# Patient Record
Sex: MALE | Race: OTHER | NOT HISPANIC OR LATINO | URBAN - METROPOLITAN AREA
[De-identification: names, ages, dates, MRNs, and addresses within clinical notes are randomized per-mention and may not be internally consistent; named-entity substitution may affect disease eponyms.]

---

## 2018-03-29 ENCOUNTER — EMERGENCY (EMERGENCY)
Facility: HOSPITAL | Age: 43
LOS: 1 days | Discharge: ANOTHER HOSPITAL | End: 2018-03-29
Attending: EMERGENCY MEDICINE
Payer: MEDICAID

## 2018-03-29 ENCOUNTER — INPATIENT (INPATIENT)
Facility: HOSPITAL | Age: 43
LOS: 1 days | Discharge: HOME | DRG: 84 | End: 2018-03-31
Attending: SURGERY | Admitting: SURGERY
Payer: MEDICAID

## 2018-03-29 VITALS
RESPIRATION RATE: 17 BRPM | TEMPERATURE: 98 F | DIASTOLIC BLOOD PRESSURE: 110 MMHG | HEART RATE: 104 BPM | SYSTOLIC BLOOD PRESSURE: 144 MMHG | OXYGEN SATURATION: 100 %

## 2018-03-29 VITALS
DIASTOLIC BLOOD PRESSURE: 88 MMHG | TEMPERATURE: 98 F | RESPIRATION RATE: 18 BRPM | OXYGEN SATURATION: 99 % | HEIGHT: 68 IN | SYSTOLIC BLOOD PRESSURE: 135 MMHG | HEART RATE: 104 BPM | WEIGHT: 160.06 LBS

## 2018-03-29 LAB
ALBUMIN SERPL ELPH-MCNC: 4.9 G/DL — SIGNIFICANT CHANGE UP (ref 3.3–5)
ALP SERPL-CCNC: 60 U/L — SIGNIFICANT CHANGE UP (ref 40–120)
ALT FLD-CCNC: 15 U/L RC — SIGNIFICANT CHANGE UP (ref 10–45)
ANION GAP SERPL CALC-SCNC: 17 MMOL/L — SIGNIFICANT CHANGE UP (ref 5–17)
ANION GAP SERPL CALC-SCNC: 9 MMOL/L — SIGNIFICANT CHANGE UP (ref 5–17)
ANISOCYTOSIS BLD QL: SIGNIFICANT CHANGE UP
APTT BLD: 30.9 SEC — SIGNIFICANT CHANGE UP (ref 27.5–37.4)
APTT BLD: 33.1 SEC — SIGNIFICANT CHANGE UP (ref 27.5–37.4)
AST SERPL-CCNC: 40 U/L — SIGNIFICANT CHANGE UP (ref 10–40)
BASOPHILS # BLD AUTO: 0 K/UL — SIGNIFICANT CHANGE UP (ref 0–0.2)
BASOPHILS # BLD AUTO: 0.1 K/UL — SIGNIFICANT CHANGE UP (ref 0–0.2)
BASOPHILS NFR BLD AUTO: 0.4 % — SIGNIFICANT CHANGE UP (ref 0–2)
BASOPHILS NFR BLD AUTO: 1.1 % — SIGNIFICANT CHANGE UP (ref 0–2)
BILIRUB SERPL-MCNC: 0.5 MG/DL — SIGNIFICANT CHANGE UP (ref 0.2–1.2)
BLD GP AB SCN SERPL QL: NEGATIVE — SIGNIFICANT CHANGE UP
BUN SERPL-MCNC: 11 MG/DL — SIGNIFICANT CHANGE UP (ref 7–23)
BUN SERPL-MCNC: 12 MG/DL — SIGNIFICANT CHANGE UP (ref 7–18)
CALCIUM SERPL-MCNC: 8.3 MG/DL — LOW (ref 8.4–10.5)
CALCIUM SERPL-MCNC: 8.9 MG/DL — SIGNIFICANT CHANGE UP (ref 8.4–10.5)
CHLORIDE SERPL-SCNC: 103 MMOL/L — SIGNIFICANT CHANGE UP (ref 96–108)
CHLORIDE SERPL-SCNC: 109 MMOL/L — HIGH (ref 96–108)
CO2 SERPL-SCNC: 22 MMOL/L — SIGNIFICANT CHANGE UP (ref 22–31)
CO2 SERPL-SCNC: 24 MMOL/L — SIGNIFICANT CHANGE UP (ref 22–31)
CREAT SERPL-MCNC: 0.73 MG/DL — SIGNIFICANT CHANGE UP (ref 0.5–1.3)
CREAT SERPL-MCNC: 0.79 MG/DL — SIGNIFICANT CHANGE UP (ref 0.5–1.3)
EOSINOPHIL # BLD AUTO: 0 K/UL — SIGNIFICANT CHANGE UP (ref 0–0.5)
EOSINOPHIL # BLD AUTO: 0 K/UL — SIGNIFICANT CHANGE UP (ref 0–0.5)
EOSINOPHIL NFR BLD AUTO: 0.7 % — SIGNIFICANT CHANGE UP (ref 0–6)
EOSINOPHIL NFR BLD AUTO: 0.9 % — SIGNIFICANT CHANGE UP (ref 0–6)
ETHANOL SERPL-MCNC: 313 MG/DL — HIGH (ref 0–10)
ETHANOL SERPL-MCNC: 381 MG/DL — HIGH (ref 0–10)
GLUCOSE SERPL-MCNC: 101 MG/DL — HIGH (ref 70–99)
GLUCOSE SERPL-MCNC: 101 MG/DL — HIGH (ref 70–99)
HCT VFR BLD CALC: 36.2 % — LOW (ref 39–50)
HCT VFR BLD CALC: 36.4 % — LOW (ref 39–50)
HGB BLD-MCNC: 10 G/DL — LOW (ref 13–17)
HGB BLD-MCNC: 11.5 G/DL — LOW (ref 13–17)
HYPOCHROMIA BLD QL: SIGNIFICANT CHANGE UP
INR BLD: 0.95 RATIO — SIGNIFICANT CHANGE UP (ref 0.88–1.16)
INR BLD: 0.96 RATIO — SIGNIFICANT CHANGE UP (ref 0.88–1.16)
LYMPHOCYTES # BLD AUTO: 0.9 K/UL — LOW (ref 1–3.3)
LYMPHOCYTES # BLD AUTO: 1.2 K/UL — SIGNIFICANT CHANGE UP (ref 1–3.3)
LYMPHOCYTES # BLD AUTO: 17.9 % — SIGNIFICANT CHANGE UP (ref 13–44)
LYMPHOCYTES # BLD AUTO: 24.7 % — SIGNIFICANT CHANGE UP (ref 13–44)
MACROCYTES BLD QL: SLIGHT — SIGNIFICANT CHANGE UP
MCHC RBC-ENTMCNC: 19.1 PG — LOW (ref 27–34)
MCHC RBC-ENTMCNC: 21.1 PG — LOW (ref 27–34)
MCHC RBC-ENTMCNC: 27.7 GM/DL — LOW (ref 32–36)
MCHC RBC-ENTMCNC: 31.6 GM/DL — LOW (ref 32–36)
MCV RBC AUTO: 66.8 FL — LOW (ref 80–100)
MCV RBC AUTO: 68.9 FL — LOW (ref 80–100)
MICROCYTES BLD QL: SIGNIFICANT CHANGE UP
MONOCYTES # BLD AUTO: 0.2 K/UL — SIGNIFICANT CHANGE UP (ref 0–0.9)
MONOCYTES # BLD AUTO: 0.3 K/UL — SIGNIFICANT CHANGE UP (ref 0–0.9)
MONOCYTES NFR BLD AUTO: 4.8 % — SIGNIFICANT CHANGE UP (ref 2–14)
MONOCYTES NFR BLD AUTO: 5.6 % — SIGNIFICANT CHANGE UP (ref 2–14)
NEUTROPHILS # BLD AUTO: 3.3 K/UL — SIGNIFICANT CHANGE UP (ref 1.8–7.4)
NEUTROPHILS # BLD AUTO: 3.6 K/UL — SIGNIFICANT CHANGE UP (ref 1.8–7.4)
NEUTROPHILS NFR BLD AUTO: 68.5 % — SIGNIFICANT CHANGE UP (ref 43–77)
NEUTROPHILS NFR BLD AUTO: 75.5 % — SIGNIFICANT CHANGE UP (ref 43–77)
PLAT MORPH BLD: NORMAL — SIGNIFICANT CHANGE UP
PLATELET # BLD AUTO: 172 K/UL — SIGNIFICANT CHANGE UP (ref 150–400)
PLATELET # BLD AUTO: 192 K/UL — SIGNIFICANT CHANGE UP (ref 150–400)
POIKILOCYTOSIS BLD QL AUTO: SLIGHT — SIGNIFICANT CHANGE UP
POLYCHROMASIA BLD QL SMEAR: SLIGHT — SIGNIFICANT CHANGE UP
POTASSIUM SERPL-MCNC: 4 MMOL/L — SIGNIFICANT CHANGE UP (ref 3.5–5.3)
POTASSIUM SERPL-MCNC: 4.7 MMOL/L — SIGNIFICANT CHANGE UP (ref 3.5–5.3)
POTASSIUM SERPL-SCNC: 4 MMOL/L — SIGNIFICANT CHANGE UP (ref 3.5–5.3)
POTASSIUM SERPL-SCNC: 4.7 MMOL/L — SIGNIFICANT CHANGE UP (ref 3.5–5.3)
PROT SERPL-MCNC: 8.6 G/DL — HIGH (ref 6–8.3)
PROTHROM AB SERPL-ACNC: 10.3 SEC — SIGNIFICANT CHANGE UP (ref 9.8–12.7)
PROTHROM AB SERPL-ACNC: 10.5 SEC — SIGNIFICANT CHANGE UP (ref 9.8–12.7)
RBC # BLD: 5.25 M/UL — SIGNIFICANT CHANGE UP (ref 4.2–5.8)
RBC # BLD: 5.46 M/UL — SIGNIFICANT CHANGE UP (ref 4.2–5.8)
RBC # FLD: 17.9 % — HIGH (ref 10.3–14.5)
RBC # FLD: 18.1 % — HIGH (ref 10.3–14.5)
RBC BLD AUTO: ABNORMAL
RH IG SCN BLD-IMP: NEGATIVE — SIGNIFICANT CHANGE UP
SODIUM SERPL-SCNC: 142 MMOL/L — SIGNIFICANT CHANGE UP (ref 135–145)
SODIUM SERPL-SCNC: 142 MMOL/L — SIGNIFICANT CHANGE UP (ref 135–145)
TARGETS BLD QL SMEAR: SLIGHT — SIGNIFICANT CHANGE UP
WBC # BLD: 4.8 K/UL — SIGNIFICANT CHANGE UP (ref 3.8–10.5)
WBC # BLD: 4.8 K/UL — SIGNIFICANT CHANGE UP (ref 3.8–10.5)
WBC # FLD AUTO: 4.8 K/UL — SIGNIFICANT CHANGE UP (ref 3.8–10.5)
WBC # FLD AUTO: 4.8 K/UL — SIGNIFICANT CHANGE UP (ref 3.8–10.5)

## 2018-03-29 PROCEDURE — 72125 CT NECK SPINE W/O DYE: CPT

## 2018-03-29 PROCEDURE — 72125 CT NECK SPINE W/O DYE: CPT | Mod: 26

## 2018-03-29 PROCEDURE — 85730 THROMBOPLASTIN TIME PARTIAL: CPT

## 2018-03-29 PROCEDURE — 99291 CRITICAL CARE FIRST HOUR: CPT

## 2018-03-29 PROCEDURE — 99285 EMERGENCY DEPT VISIT HI MDM: CPT | Mod: 25

## 2018-03-29 PROCEDURE — 80307 DRUG TEST PRSMV CHEM ANLYZR: CPT

## 2018-03-29 PROCEDURE — 80048 BASIC METABOLIC PNL TOTAL CA: CPT

## 2018-03-29 PROCEDURE — 71045 X-RAY EXAM CHEST 1 VIEW: CPT

## 2018-03-29 PROCEDURE — 70450 CT HEAD/BRAIN W/O DYE: CPT | Mod: 26

## 2018-03-29 PROCEDURE — 85610 PROTHROMBIN TIME: CPT

## 2018-03-29 PROCEDURE — 99291 CRITICAL CARE FIRST HOUR: CPT | Mod: 25

## 2018-03-29 PROCEDURE — 93010 ELECTROCARDIOGRAM REPORT: CPT

## 2018-03-29 PROCEDURE — 71045 X-RAY EXAM CHEST 1 VIEW: CPT | Mod: 26

## 2018-03-29 PROCEDURE — 82962 GLUCOSE BLOOD TEST: CPT

## 2018-03-29 PROCEDURE — 70450 CT HEAD/BRAIN W/O DYE: CPT

## 2018-03-29 PROCEDURE — 85027 COMPLETE CBC AUTOMATED: CPT

## 2018-03-29 RX ORDER — DIPHENHYDRAMINE HCL 50 MG
25 CAPSULE ORAL ONCE
Qty: 0 | Refills: 0 | Status: COMPLETED | OUTPATIENT
Start: 2018-03-29 | End: 2018-03-29

## 2018-03-29 RX ORDER — SODIUM CHLORIDE 9 MG/ML
1000 INJECTION, SOLUTION INTRAVENOUS
Qty: 0 | Refills: 0 | Status: DISCONTINUED | OUTPATIENT
Start: 2018-03-29 | End: 2018-03-31

## 2018-03-29 RX ORDER — HYDROCORTISONE 20 MG
100 TABLET ORAL ONCE
Qty: 0 | Refills: 0 | Status: COMPLETED | OUTPATIENT
Start: 2018-03-29 | End: 2018-03-29

## 2018-03-29 RX ADMIN — Medication 100 MILLIGRAM(S): at 20:23

## 2018-03-29 RX ADMIN — SODIUM CHLORIDE 250 MILLILITER(S): 9 INJECTION, SOLUTION INTRAVENOUS at 19:25

## 2018-03-29 NOTE — ED PROVIDER NOTE - ENMT, MLM
Airway patent, Nasal mucosa clear. Mouth with normal mucosa. Throat has no vesicles, no oropharyngeal exudates and uvula is midline. Poor dentition.

## 2018-03-29 NOTE — CONSULT NOTE ADULT - SUBJECTIVE AND OBJECTIVE BOX
Pager: [phone]     HPI: 43M with unknown past medical history found intoxicated the baseline of apartment. Initially, unresponsive and brought to hospital where CT head showed small left frontal contusion and traumatic subarachnoid. He was transferred to [facility] for neurosurgical evaluation. Repeat CT head showed worsening left frontal contusions. Patient denies headache but unable to give detailed history.     PAST MEDICAL HISTORY   Unknown    PAST SURGICAL HISTORY   Unknown    No Known Allergies    MEDICATIONS:  Antibiotics:    Neuro:    Anticoagulation:    Other:  multivitamin/thiamine/folic acid in sodium chloride 0.9% 1000 milliLiter(s) IV Continuous <Continuous>    REVIEW OF SYSTEMS:  Check here if all are normal other than Neurological []  General:  Eyes:  ENT:  Cardiac:  Respiratory:  GI: abd pain  Musculoskeletal:   Skin:  Neurologic:   Psychiatric:     PHYSICAL EXAMINATION:   T(C): 36.9 (03-29-18 @ 18:30), Max: 36.9 (03-29-18 @ 18:30)  HR: 109 (03-29-18 @ 18:30) (104 - 109)  BP: 153/107 (03-29-18 @ 18:30) (144/110 - 153/107)  RR: 18 (03-29-18 @ 18:30) (17 - 18)  SpO2: 100% (03-29-18 @ 18:30) (100% - 100%)  Wt(kg): --    AOx1, Following Commands  CN: PERRL, EOMI, no facial droop  Motor: 5/5 throughout, no drift  Sensation intact to light touch  Reflexes: no clonus     LABS:    PT/INR - ( 29 Mar 2018 18:40 )   PT: 10.5 sec;   INR: 0.96 ratio         PTT - ( 29 Mar 2018 18:40 )  PTT:30.9 sec

## 2018-03-29 NOTE — ED ADULT NURSE NOTE - OBJECTIVE STATEMENT
42 yo M presents to ED A+Ox2 via EMS, transfer from [place] ED 44 yo M presents to ED A+Ox2 via EMS, transfer from [place] ED for head bleed. As per EMS, pt. was "found down" 44 yo M presents to ED A+Ox2 via EMS, transfer from [place] ED for head bleed. As per EMS, pt. was "found down" and "altered." Pt. denies pain, when asked questions repeating "it is okay." Pt. alert 42 yo M presents to ED A+Ox2 via EMS, transfer from [place] ED for head bleed. As per EMS, pt. was "found down" and "altered." Pt. denies pain, when asked questions repeating "it is okay." Pt. alert to person and place. Pupils equal round and reactive to light. Moves all extremities. Pt. was found to "test positive for ETOH" at [facility], pt. reports having "one bottle" last night, denies every day alcohol use. Denies any drug use. Speech clear. Pt. arrives in c-collar in place. Breathing unlabored on RA. Skin warm pink and dry. Abdomen soft. Pt. placed on CM, EKG completed and given to MD. See paper neuro sheet for additional neuro assessment. One to one initiated for safety. Comfort and safety measures in place.

## 2018-03-29 NOTE — ED PROVIDER NOTE - OBJECTIVE STATEMENT
44 y/o M pt with no PMHx and no significant PSHx BIB EMS to ED with confusion noted today. Per EMS, pt was found by bystanders in the basement of a building. Pt denies general pain, or any other complaints. Pt also denies Hx of smoking, or recent fall. NKDA. 44 y/o M pt with no PMHx and no significant PSHx BIB EMS to ED with confusion noted today. Per EMS, pt was found by bystanders in the basement of a building. As per EMS "bystanders stated that they found him unconscious in the basement of the resience. They have never seen him before, so they placed him on a wheel chair and tok him outside." Pt denies general pain, or any other complaints. Pt also denies Hx of smoking, or recent fall. NKDA. 42 y/o M pt with no PMHx and no significant PSHx BIB EMS to ED with confusion noted today. Per EMS, pt was found by bystanders in the basement of a building. As per EMS "bystanders stated that they found him unconscious in the basement of the resience. They have never seen him before, so they placed him on a wheel chair and tok him outside." Pt denies general pain, or any other complaints. Pt also denies Hx of smoking, or recent fall. NKDA. Sophia  utilized and pt is till confused.

## 2018-03-29 NOTE — ED PROVIDER NOTE - MEDICAL DECISION MAKING DETAILS
42 y/o M pt presents with confusion, suspected EtOH abuse. Will send labs, CT Head, and will reassess. Unable to determine when sx's began.

## 2018-03-29 NOTE — ED PROVIDER NOTE - PHYSICAL EXAMINATION
Attending Arteaga: Gen: NAD, heent: atrauamtic, eomi, perrla, mmm, op pink, uvula midline, neck; nttp, no nuchal rigidity, chest: nttp, no crepitus, cv: tachycardic, , no murmurs, lungs: ctab, abd: soft, nontender, nondistended, no peritoneal signs, +BS, no guarding, ext: wwp, neg homans, skin: no rash, neuro: awake and alert, following commands, speech clear, sensation and strength intact, no focal deficits

## 2018-03-29 NOTE — ED PROVIDER NOTE - OBJECTIVE STATEMENT
44yo male p/w found down in basement confused. Pt. found to have 2mm epidural hematoma and SAH on CT at [facility] transferred for neurosurgery/trauma eval. Pt. intoxicated with alcohol level of 381, therefore c-spine not cleared. Pt. denies any headache, n/v, weakness, cp, sob. Does not remember events of fall. Pt. reports occasional alcohol use, cannot quantify, but denies withdrawals in past.

## 2018-03-29 NOTE — ED PROVIDER NOTE - SHIFT CHANGE DETAILS
Received sign-out from Dr. Arteaga regarding this patient awaiting disposition. This has since been completed with the patient admitted to Trauma Surgery. He presently appears comfortable and offers no acute complaints.

## 2018-03-29 NOTE — CONSULT NOTE ADULT - ASSESSMENT
43M found down, likely unwitnessed fall while intoxicated with worsening left frontal contusions. GCS 14    -No acute neurosurgical intervention indicated at this time  -Repeat CT in AM  -Neurochecks q4h  -Keppra 500 mg BID x 7 days for seizure ppx  -Trauma eval 43M found down, likely unwitnessed fall while intoxicated with worsening left frontal contusions. GCS 14    -No acute neurosurgical intervention indicated at this time  -Patient will require Repeat CT in AM due to increasing contusion size on interval scan  -Neurochecks q4h  -Keppra 500 mg BID x 7 days for seizure ppx  -Trauma eval

## 2018-03-29 NOTE — ED ADULT NURSE NOTE - OBJECTIVE STATEMENT
Patient BIBEMS after being found on the ground near his apartment. Patient said that he had too much to drink. No bruises or bleeding on the body noted.

## 2018-03-29 NOTE — ED ADULT TRIAGE NOTE - CHIEF COMPLAINT QUOTE
BIBA found by bystanders in an apt building basement (not his own) ETOH?, pt endorses "I had too much to drink"

## 2018-03-29 NOTE — ED PROVIDER NOTE - MEDICAL DECISION MAKING DETAILS
Attending Chandler: 42 y/o  male transferred from [facility] after ETOH use with fall with epidural hematoma. upon arrival neurologically intact. amnestic to how he fell. not on bloodthinners. plan for BP monitoring, neurosurgerical eval and trauma.

## 2018-03-30 DIAGNOSIS — I61.9 NONTRAUMATIC INTRACEREBRAL HEMORRHAGE, UNSPECIFIED: ICD-10-CM

## 2018-03-30 LAB
ANION GAP SERPL CALC-SCNC: 16 MMOL/L — SIGNIFICANT CHANGE UP (ref 5–17)
BUN SERPL-MCNC: 12 MG/DL — SIGNIFICANT CHANGE UP (ref 7–23)
CALCIUM SERPL-MCNC: 8.6 MG/DL — SIGNIFICANT CHANGE UP (ref 8.4–10.5)
CHLORIDE SERPL-SCNC: 103 MMOL/L — SIGNIFICANT CHANGE UP (ref 96–108)
CO2 SERPL-SCNC: 22 MMOL/L — SIGNIFICANT CHANGE UP (ref 22–31)
CREAT SERPL-MCNC: 0.83 MG/DL — SIGNIFICANT CHANGE UP (ref 0.5–1.3)
GLUCOSE SERPL-MCNC: 75 MG/DL — SIGNIFICANT CHANGE UP (ref 70–99)
HCT VFR BLD CALC: 33.2 % — LOW (ref 39–50)
HGB BLD-MCNC: 9.2 G/DL — LOW (ref 13–17)
MCHC RBC-ENTMCNC: 19.6 PG — LOW (ref 27–34)
MCHC RBC-ENTMCNC: 27.7 GM/DL — LOW (ref 32–36)
MCV RBC AUTO: 70.8 FL — LOW (ref 80–100)
NRBC # BLD: 0 /100 WBCS — SIGNIFICANT CHANGE UP (ref 0–0)
PLATELET # BLD AUTO: 151 K/UL — SIGNIFICANT CHANGE UP (ref 150–400)
POTASSIUM SERPL-MCNC: 3.7 MMOL/L — SIGNIFICANT CHANGE UP (ref 3.5–5.3)
POTASSIUM SERPL-SCNC: 3.7 MMOL/L — SIGNIFICANT CHANGE UP (ref 3.5–5.3)
RBC # BLD: 4.69 M/UL — SIGNIFICANT CHANGE UP (ref 4.2–5.8)
RBC # FLD: 18.3 % — HIGH (ref 10.3–14.5)
RH IG SCN BLD-IMP: NEGATIVE — SIGNIFICANT CHANGE UP
SODIUM SERPL-SCNC: 141 MMOL/L — SIGNIFICANT CHANGE UP (ref 135–145)
WBC # BLD: 5.19 K/UL — SIGNIFICANT CHANGE UP (ref 3.8–10.5)
WBC # FLD AUTO: 5.19 K/UL — SIGNIFICANT CHANGE UP (ref 3.8–10.5)

## 2018-03-30 PROCEDURE — 71260 CT THORAX DX C+: CPT | Mod: 26

## 2018-03-30 PROCEDURE — 70450 CT HEAD/BRAIN W/O DYE: CPT | Mod: 26

## 2018-03-30 PROCEDURE — 74177 CT ABD & PELVIS W/CONTRAST: CPT | Mod: 26

## 2018-03-30 RX ORDER — SODIUM CHLORIDE 9 MG/ML
1000 INJECTION INTRAMUSCULAR; INTRAVENOUS; SUBCUTANEOUS
Qty: 0 | Refills: 0 | Status: DISCONTINUED | OUTPATIENT
Start: 2018-03-30 | End: 2018-03-30

## 2018-03-30 RX ORDER — POTASSIUM CHLORIDE 20 MEQ
20 PACKET (EA) ORAL ONCE
Qty: 0 | Refills: 0 | Status: COMPLETED | OUTPATIENT
Start: 2018-03-30 | End: 2018-03-30

## 2018-03-30 RX ORDER — LEVETIRACETAM 250 MG/1
500 TABLET, FILM COATED ORAL
Qty: 0 | Refills: 0 | Status: DISCONTINUED | OUTPATIENT
Start: 2018-03-30 | End: 2018-03-31

## 2018-03-30 RX ORDER — POTASSIUM CHLORIDE 20 MEQ
10 PACKET (EA) ORAL
Qty: 0 | Refills: 0 | Status: DISCONTINUED | OUTPATIENT
Start: 2018-03-30 | End: 2018-03-30

## 2018-03-30 RX ORDER — ACETAMINOPHEN 500 MG
650 TABLET ORAL EVERY 6 HOURS
Qty: 0 | Refills: 0 | Status: DISCONTINUED | OUTPATIENT
Start: 2018-03-30 | End: 2018-03-31

## 2018-03-30 RX ADMIN — Medication 20 MILLIEQUIVALENT(S): at 13:38

## 2018-03-30 RX ADMIN — SODIUM CHLORIDE 250 MILLILITER(S): 9 INJECTION, SOLUTION INTRAVENOUS at 21:23

## 2018-03-30 RX ADMIN — Medication 25 MILLIGRAM(S): at 00:19

## 2018-03-30 RX ADMIN — Medication 650 MILLIGRAM(S): at 12:27

## 2018-03-30 RX ADMIN — Medication 100 MILLIEQUIVALENT(S): at 11:41

## 2018-03-30 RX ADMIN — Medication 650 MILLIGRAM(S): at 09:34

## 2018-03-30 RX ADMIN — Medication 650 MILLIGRAM(S): at 23:46

## 2018-03-30 RX ADMIN — LEVETIRACETAM 500 MILLIGRAM(S): 250 TABLET, FILM COATED ORAL at 05:14

## 2018-03-30 RX ADMIN — Medication 100 MILLIEQUIVALENT(S): at 09:43

## 2018-03-30 RX ADMIN — LEVETIRACETAM 500 MILLIGRAM(S): 250 TABLET, FILM COATED ORAL at 17:40

## 2018-03-30 NOTE — H&P ADULT - NSHPLABSRESULTS_GEN_ALL_CORE
03-29-18    WBC: 4.8  Hgb: 11.5  Hct: 36.4  Plt: 192    Na: 142  K: 4.7  Cl: 103  HCO3: 22  BUN: 11  Cr: 0.79  Glu: 101    Ca: 8.9    Protein: 8.6  Albumin: 4.9  Total bilirubin: 0.5  Alk phos: 60  AST: 40  ALT: 15  Lipase: 21    INR: 0.96  PTT: 30.9    CT head (2nd): Acute left frontal hemorrhagic contusions and scattered areas of acute subarachnoid hemorrhage. No midline shift. Additional thin left frontoparietal convexity chronic subdural hematoma versus hygroma.  CT c-spine (from OSH): No fracture  CT chest/abdomen/pelvis: No acute thoracic, intra-abdominal, or pelvic pathology

## 2018-03-30 NOTE — H&P ADULT - ASSESSMENT
43 M, intoxicated, presents after a fall. Found to have an acute L frontal hemorrhagic contusions and scattered areas of SAH. Also has chronic SDH. Neurosurgery evaluated the patient. Believe 2nd CT head shows worsening hemorrhagic contusions. Recommended Keppra for seizure prophylaxis and repeat CT head in AM. Will admit to Trauma Surgery ([facility]) and follow up repeat CT head in AM.  -F/u CT head in AM  -Continue Keppra  -NPO  -IVF  -Mechanical VTE prophylaxis given hemorrhagic contusions and SAH  -D/w attending  JAMI Fitzgerald  [id number]

## 2018-03-30 NOTE — ED ADULT NURSE REASSESSMENT NOTE - NS ED NURSE REASSESS COMMENT FT1
pharmacy contacted to send vitamin bag
Pt AAOx2, NAD, resp nonlabored, resting comfortably in bed. Pt is calm/cooperative at this time. Pt denies headache, dizziness, chest pain, abd pain, weakness at this time. No SOC, cough, n/v/d. Pt awaiting admit. Surg MD was at bedside assessing pt. Safety maintained.
Report received from Cat Silver. Pt AAOx2, able to say name, birthday and date but disoriented to location. Pt is in NAD, resp nonlabored, skin warm/dry, resting comfortably in bed, c-collar remains in place. Pt c/o 5/10 headache. Pt denies dizziness, chest pain, palpitations, SOB, abd pain, n/v/d, fevers, chills at this time. Pt awaiting CT, pre-medicated and CT is aware pt was premedicated. Safety maintained.

## 2018-03-30 NOTE — PROGRESS NOTE ADULT - ASSESSMENT
43M found down, likely unwitnessed fall while intoxicated with worsening left frontal contusions.    -No acute neurosurgical intervention indicated at this time  -Patient will require Repeat CT in AM due to increasing contusion size on interval scan  -Neurochecks q4h  -Keppra 500 mg BID x 7 days for seizure ppx  -Trauma eval 43M found down, likely unwitnessed fall while intoxicated with worsening left frontal contusions.    -No acute neurosurgical intervention indicated at this time  -Patient will require Repeat CT in AM due to increasing contusion size on interval scan  -Neurochecks q4h  -Keppra 500 mg BID x 7 days for seizure ppx  -Trauma eval      - CTH from this morning reviewed. Stable exam. No further neurosurgical intervention anticipated. Please reconsult as needed

## 2018-03-30 NOTE — H&P ADULT - HISTORY OF PRESENT ILLNESS
43 M presents after a fall. Patient was intoxicated and reportedly fell at "work." Patient does not remember what happened. Patient was evaluated at OSH and then transferred to [facility] for further management. Currently, patient complains of some abdominal pain, but reports pain has been there before his fall. He has no other complaints.    As per patient, he had an admission at OSH about 2 years ago for pancreatitis, requiring a tracheostomy tube placement. Patient though continues to drink. Patient does not take any medications.

## 2018-03-30 NOTE — H&P ADULT - NSHPPHYSICALEXAM_GEN_ALL_CORE
Tmax: 36.9 (03-29-18 @ 18:30)  HR: 93  BP: 124/76  RR: 16  SpO2: 98%    Gen: NAD  Head: No obvious deformities  Neuro: Moves all extremities  Neck: No c-spine tenderness  Chest: No obvious deformities  Lungs: Non-labored breathing  Heart: S1, S2  Abdomen: Soft, mildly distended, mild R and L sided tenderness  Pelvis: Stable  Extremities: No deformities  Back: No deformities

## 2018-03-30 NOTE — H&P ADULT - ATTENDING COMMENTS
Agree with above. Admit to [facility], floor. NPO, IVF, repeat CT head, neurosurgery following. Keppra. Hold chemical dvt prophylaxis. TARA.

## 2018-03-30 NOTE — ED ADULT NURSE REASSESSMENT NOTE - GENERAL PATIENT STATE
cooperative/comfortable appearance
cooperative/comfortable appearance/resting/sleeping
cooperative/comfortable appearance/resting/sleeping/no change observed
cooperative/comfortable appearance

## 2018-03-31 ENCOUNTER — TRANSCRIPTION ENCOUNTER (OUTPATIENT)
Age: 43
End: 2018-03-31

## 2018-03-31 VITALS — SYSTOLIC BLOOD PRESSURE: 148 MMHG | HEART RATE: 109 BPM | OXYGEN SATURATION: 99 % | DIASTOLIC BLOOD PRESSURE: 98 MMHG

## 2018-03-31 LAB
ANION GAP SERPL CALC-SCNC: 8 MMOL/L — SIGNIFICANT CHANGE UP (ref 5–17)
BUN SERPL-MCNC: 15 MG/DL — SIGNIFICANT CHANGE UP (ref 7–23)
CALCIUM SERPL-MCNC: 8.5 MG/DL — SIGNIFICANT CHANGE UP (ref 8.4–10.5)
CHLORIDE SERPL-SCNC: 102 MMOL/L — SIGNIFICANT CHANGE UP (ref 96–108)
CO2 SERPL-SCNC: 26 MMOL/L — SIGNIFICANT CHANGE UP (ref 22–31)
CREAT SERPL-MCNC: 0.81 MG/DL — SIGNIFICANT CHANGE UP (ref 0.5–1.3)
GLUCOSE SERPL-MCNC: 127 MG/DL — HIGH (ref 70–99)
HCT VFR BLD CALC: 30.5 % — LOW (ref 39–50)
HGB BLD-MCNC: 8.5 G/DL — LOW (ref 13–17)
MCHC RBC-ENTMCNC: 20 PG — LOW (ref 27–34)
MCHC RBC-ENTMCNC: 27.9 GM/DL — LOW (ref 32–36)
MCV RBC AUTO: 71.6 FL — LOW (ref 80–100)
NRBC # BLD: 0 /100 WBCS — SIGNIFICANT CHANGE UP (ref 0–0)
PLATELET # BLD AUTO: 125 K/UL — LOW (ref 150–400)
POTASSIUM SERPL-MCNC: 3.6 MMOL/L — SIGNIFICANT CHANGE UP (ref 3.5–5.3)
POTASSIUM SERPL-SCNC: 3.6 MMOL/L — SIGNIFICANT CHANGE UP (ref 3.5–5.3)
RBC # BLD: 4.26 M/UL — SIGNIFICANT CHANGE UP (ref 4.2–5.8)
RBC # FLD: 18 % — HIGH (ref 10.3–14.5)
SODIUM SERPL-SCNC: 136 MMOL/L — SIGNIFICANT CHANGE UP (ref 135–145)
WBC # BLD: 4.39 K/UL — SIGNIFICANT CHANGE UP (ref 3.8–10.5)
WBC # FLD AUTO: 4.39 K/UL — SIGNIFICANT CHANGE UP (ref 3.8–10.5)

## 2018-03-31 PROCEDURE — 83735 ASSAY OF MAGNESIUM: CPT

## 2018-03-31 PROCEDURE — 85027 COMPLETE CBC AUTOMATED: CPT

## 2018-03-31 PROCEDURE — 97161 PT EVAL LOW COMPLEX 20 MIN: CPT

## 2018-03-31 PROCEDURE — 96375 TX/PRO/DX INJ NEW DRUG ADDON: CPT

## 2018-03-31 PROCEDURE — 70450 CT HEAD/BRAIN W/O DYE: CPT

## 2018-03-31 PROCEDURE — 86850 RBC ANTIBODY SCREEN: CPT

## 2018-03-31 PROCEDURE — 86900 BLOOD TYPING SEROLOGIC ABO: CPT

## 2018-03-31 PROCEDURE — 86901 BLOOD TYPING SEROLOGIC RH(D): CPT

## 2018-03-31 PROCEDURE — 96374 THER/PROPH/DIAG INJ IV PUSH: CPT | Mod: XU

## 2018-03-31 PROCEDURE — 71260 CT THORAX DX C+: CPT

## 2018-03-31 PROCEDURE — 85610 PROTHROMBIN TIME: CPT

## 2018-03-31 PROCEDURE — 83690 ASSAY OF LIPASE: CPT

## 2018-03-31 PROCEDURE — 74177 CT ABD & PELVIS W/CONTRAST: CPT

## 2018-03-31 PROCEDURE — 93005 ELECTROCARDIOGRAM TRACING: CPT

## 2018-03-31 PROCEDURE — 80307 DRUG TEST PRSMV CHEM ANLYZR: CPT

## 2018-03-31 PROCEDURE — 99285 EMERGENCY DEPT VISIT HI MDM: CPT | Mod: 25

## 2018-03-31 PROCEDURE — 80048 BASIC METABOLIC PNL TOTAL CA: CPT

## 2018-03-31 PROCEDURE — 84100 ASSAY OF PHOSPHORUS: CPT

## 2018-03-31 PROCEDURE — 80053 COMPREHEN METABOLIC PANEL: CPT

## 2018-03-31 PROCEDURE — 85730 THROMBOPLASTIN TIME PARTIAL: CPT

## 2018-03-31 RX ORDER — LEVETIRACETAM 250 MG/1
1 TABLET, FILM COATED ORAL
Qty: 12 | Refills: 0 | OUTPATIENT
Start: 2018-03-31 | End: 2018-04-05

## 2018-03-31 RX ORDER — ACETAMINOPHEN 500 MG
2 TABLET ORAL
Qty: 0 | Refills: 0 | COMMUNITY
Start: 2018-03-31

## 2018-03-31 RX ADMIN — LEVETIRACETAM 500 MILLIGRAM(S): 250 TABLET, FILM COATED ORAL at 06:12

## 2018-03-31 RX ADMIN — Medication 650 MILLIGRAM(S): at 00:00

## 2018-03-31 NOTE — DISCHARGE NOTE ADULT - CARE PROVIDERS DIRECT ADDRESSES
[email or web address]

## 2018-03-31 NOTE — DISCHARGE NOTE ADULT - ADDITIONAL INSTRUCTIONS
Please follow up with Neurosurgery (Masood Alonso M.D.) as an outpatient  in 2-3 weeks. Please call the office at [phone] to schedule an appointment.    Please follow up with your Trauma Surgeon (Gurdeep Ram M.D.) only if needed at [address] [place] [address] [address] [place] [address] , phone #[phone].

## 2018-03-31 NOTE — DISCHARGE NOTE ADULT - PRINCIPAL DIAGNOSIS
Traumatic hemorrhage of cerebrum with loss of consciousness, unspecified laterality, initial encounter

## 2018-03-31 NOTE — DISCHARGE NOTE ADULT - PLAN OF CARE
Resolution of bleed Please follow up with Dr Alonso in 2-3 weeks.     Please follow up with your Trauma Doctor Dr Ram only if needed at [address] [place] [address] [address] [address] , phone #[phone]. Please follow up with Neurosurgery (Masood Alonso M.D.) as an outpatient  in 2-3 weeks. Please call the office at [phone] to schedule an appointment.    Please follow up with your Trauma Doctor Dr Ram only if needed at [address] [place] [address] [address] [place] [address] , phone #[phone].

## 2018-03-31 NOTE — DISCHARGE NOTE ADULT - HOSPITAL COURSE
43 M, intoxicated, presents after a fall. Found to have an acute L frontal hemorrhagic contusions and scattered areas of SAH. Also has chronic SDH. Neurosurgery evaluated the patient; Believed 2nd CT head shows worsening hemorrhagic contusions.  Recommended Keppra for seizure prophylaxis and repeat CT head in AM. Admitted to Trauma Surgery ([facility]).  Repeat (3rd) CT Head; Stable exam. No further neurosurgical intervention anticipated.   At the time of discharge, the patient was hemodynamically stable, was tolerating PO diet, was voiding urine, was ambulating, and was comfortable with adequate pain control. The patient was instructed to follow up with Dr. Alonso from Neurosurgery within 1-2 weeks after discharge from the hospital. The patient felt comfortable with discharge. The patient was discharged to home. The patient had no other issues.

## 2018-03-31 NOTE — PHYSICAL THERAPY INITIAL EVALUATION ADULT - PERTINENT HX OF CURRENT PROBLEM, REHAB EVAL
43 M, intoxicated, presents after a fall. Found to have an acute L frontal hemorrhagic contusions and scattered areas of SAH. Also has chronic SDH.

## 2018-03-31 NOTE — DISCHARGE NOTE ADULT - CARE PROVIDER_API CALL
Masood Alonso (MD), Neurological Surgery  [address] [place] [address] [address]  [place] [address]  Phone: [phone]  Fax: [phone] Masood Alonso), Neurological Surgery  [address] [facility]  [address]  [place] [address]  Phone: [phone]  Fax: [phone]    Gurdeep Ram), Surgery  [address] [place] [address]  Phone: [phone]  Fax: [phone]

## 2018-03-31 NOTE — DISCHARGE NOTE ADULT - PATIENT PORTAL LINK FT
You can access the [facility] Patient Portal, offered by , by registering with the following website: [email or web address], [facility]at [facility]

## 2018-03-31 NOTE — PROGRESS NOTE ADULT - SUBJECTIVE AND OBJECTIVE BOX
Visit Summary: Patient seen and evaluated at bedside.    Overnight Events: none    Exam:  T(C): 36.7 (03-30-18 @ 04:45), Max: 36.9 (03-29-18 @ 18:30)  HR: 99 (03-30-18 @ 04:45) (93 - 109)  BP: 166/97 (03-30-18 @ 04:45) (124/76 - 166/97)  RR: 20 (03-30-18 @ 04:45) (16 - 20)  SpO2: 99% (03-30-18 @ 04:45) (98% - 100%)  Wt(kg): --    AOx2, FC, PERRL, EOMI, V1-3 intact, no facial, palate les symmetric, tongue midline, shrug 5/5  5/5 throughout, no drift  SILT  No clonus or babinski                            11.5   4.8   )-----------( 192      ( 29 Mar 2018 18:40 )             36.4     03-29    142  |  103  |  11  ----------------------------<  101<H>  4.7   |  22  |  0.79    Ca    8.9      29 Mar 2018 18:40  Phos  2.5     03-29  Mg     2.5     03-29    TPro  8.6<H>  /  Alb  4.9  /  TBili  0.5  /  DBili  x   /  AST  40  /  ALT  15  /  AlkPhos  60  03-29  PT/INR - ( 29 Mar 2018 18:40 )   PT: 10.5 sec;   INR: 0.96 ratio         PTT - ( 29 Mar 2018 18:40 )  PTT:30.9 sec
ATP Progress note:    Hospital Day: 2; admitted for subarachnoid hematoma (found down, intoxicated).    Subjective:  No acute overnight events. Patient was examined at bedside this AM, resting.  No acute issues at this time.    Objective:  T(C): 36.8 (03-31-18 @ 07:53), Max: 38 (03-30-18 @ 22:04)  HR: 109 (03-31-18 @ 11:18) (86 - 114)  BP: 148/98 (03-31-18 @ 11:18) (120/74 - 152/97)  RR: 18 (03-31-18 @ 07:53) (18 - 18)  SpO2: 99% (03-31-18 @ 11:18) (97% - 100%)    Labs:                      8.5    4.39  )-----------( 125      ( 31 Mar 2018 08:16 )             30.5       03-31    136  |  102  |  15  ----------------------------<  127<H>  3.6   |  26  |  0.81    Ca    8.5      31 Mar 2018 07:01  Phos  2.5     03-29  Mg     2.5     03-29    TPro  8.6<H>  /  Alb  4.9  /  TBili  0.5  /  DBili  x   /  AST  40  /  ALT  15  /  AlkPhos  60  03-29      I&O's Detail    30 Mar 2018 07:01  -  31 Mar 2018 07:00  --------------------------------------------------------  IN:    IV PiggyBack: 200 mL    multivitamin/thiamine/folic acid in sodium chloride 0.9%: 1000 mL    Oral Fluid: 1100 mL    sodium chloride 0.9%: 500 mL  Total IN: 2800 mL    OUT:    Voided: 1550 mL  Total OUT: 1550 mL    Total NET: 1250 mL      31 Mar 2018 07:01  -  31 Mar 2018 14:34  --------------------------------------------------------  IN:  Total IN: 0 mL    OUT:    Voided: 525 mL  Total OUT: 525 mL    Total NET: -525 mL          Focused Physical Exam:  General: NAD  Respiratory: Nonlabored breathing  Abdomen: soft, nt, nd    Medications:    03-30-18 @ 07:01  -  03-31-18 @ 07:00  --------------------------------------------------------  IN: 2800 mL / OUT: 1550 mL / NET: 1250 mL    03-31-18 @ 07:01  -  03-31-18 @ 14:34  --------------------------------------------------------  IN: 0 mL / OUT: 525 mL / NET: -525 mL    IMAGING:  3/30/18 Repeat Head CT  Impression: Stable exam.

## 2018-03-31 NOTE — DISCHARGE NOTE ADULT - CARE PLAN
Principal Discharge DX:	Traumatic hemorrhage of cerebrum with loss of consciousness, unspecified laterality, initial encounter  Goal:	Resolution of bleed  Assessment and plan of treatment:	Please follow up with Dr Alonso in 2-3 weeks.     Please follow up with your Trauma Doctor Dr Ram only if needed at [address] [place] [address] [address] [place] [address] , phone #[phone]. Principal Discharge DX:	Traumatic hemorrhage of cerebrum with loss of consciousness, unspecified laterality, initial encounter  Goal:	Resolution of bleed  Assessment and plan of treatment:	Please follow up with Neurosurgery (Masood Alonso M.D.) as an outpatient  in 2-3 weeks. Please call the office at [phone] to schedule an appointment.    Please follow up with your Trauma Doctor Dr Ram only if needed at [address] [place] [address] [address] [place] [address] , phone #[phone].

## 2018-03-31 NOTE — DISCHARGE NOTE ADULT - MEDICATION SUMMARY - MEDICATIONS TO TAKE
I will START or STAY ON the medications listed below when I get home from the hospital:    acetaminophen 325 mg oral tablet  -- 2 tab(s) by mouth every 6 hours, As needed, Mild Pain (1 - 3)  -- Indication: For mild pain    levETIRAcetam 500 mg oral tablet  -- 1 tab(s) by mouth 2 times a day  -- Indication: For  seizure ppx

## 2018-03-31 NOTE — PROGRESS NOTE ADULT - ASSESSMENT
43y M HD 2; admitted for subarachnoid hematoma s/p fall, (found down, intoxicated). Found to have an acute L frontal hemorrhagic contusions and scattered areas of SAH. Also has chronic SDH. Neurosurgery evaluated the patient; no neurosurgical intervention at this time. Repeat head CT stable.    -Continue Keppra  -reg diet  -Physical Therapy evaluated; no physical therapy needs for d/c  -Dispo: Discharge to home today, 3/31/18 w/ instructions to f/u w/ neurosurgery outpatient

## 2018-04-02 VITALS
HEART RATE: 77 BPM | RESPIRATION RATE: 18 BRPM | SYSTOLIC BLOOD PRESSURE: 119 MMHG | DIASTOLIC BLOOD PRESSURE: 73 MMHG | TEMPERATURE: 97 F | OXYGEN SATURATION: 98 %

## 2018-04-19 PROBLEM — Z00.00 ENCOUNTER FOR PREVENTIVE HEALTH EXAMINATION: Status: ACTIVE | Noted: 2018-04-19

## 2018-04-23 ENCOUNTER — APPOINTMENT (OUTPATIENT)
Dept: SPINE | Facility: CLINIC | Age: 43
End: 2018-04-23
Payer: MEDICARE

## 2018-04-23 VITALS
WEIGHT: 140 LBS | HEIGHT: 66.5 IN | DIASTOLIC BLOOD PRESSURE: 101 MMHG | SYSTOLIC BLOOD PRESSURE: 138 MMHG | BODY MASS INDEX: 22.23 KG/M2 | HEART RATE: 123 BPM

## 2018-04-23 DIAGNOSIS — Z85.9 PERSONAL HISTORY OF MALIGNANT NEOPLASM, UNSPECIFIED: ICD-10-CM

## 2018-04-23 DIAGNOSIS — Z78.9 OTHER SPECIFIED HEALTH STATUS: ICD-10-CM

## 2018-04-23 DIAGNOSIS — F10.20 ALCOHOL DEPENDENCE, UNCOMPLICATED: ICD-10-CM

## 2018-04-23 DIAGNOSIS — Z86.79 PERSONAL HISTORY OF OTHER DISEASES OF THE CIRCULATORY SYSTEM: ICD-10-CM

## 2018-04-23 PROCEDURE — 99213 OFFICE O/P EST LOW 20 MIN: CPT

## 2018-05-18 ENCOUNTER — APPOINTMENT (OUTPATIENT)
Dept: SPINE | Facility: CLINIC | Age: 43
End: 2018-05-18

## 2019-05-28 PROBLEM — F10.10 ALCOHOL ABUSE, UNCOMPLICATED: Chronic | Status: ACTIVE | Noted: 2018-03-30

## 2019-05-30 ENCOUNTER — APPOINTMENT (OUTPATIENT)
Dept: SPINE | Facility: CLINIC | Age: 44
End: 2019-05-30
Payer: MEDICAID

## 2019-05-30 VITALS
BODY MASS INDEX: 24.11 KG/M2 | SYSTOLIC BLOOD PRESSURE: 142 MMHG | WEIGHT: 150 LBS | HEIGHT: 66 IN | DIASTOLIC BLOOD PRESSURE: 90 MMHG

## 2019-05-30 DIAGNOSIS — S06.5X9A TRAUMATIC SUBDURAL HEMORRHAGE WITH LOSS OF CONSCIOUSNESS OF UNSPECIFIED DURATION, INITIAL ENCOUNTER: ICD-10-CM

## 2019-05-30 DIAGNOSIS — R25.1 TREMOR, UNSPECIFIED: ICD-10-CM

## 2019-05-30 PROCEDURE — 99213 OFFICE O/P EST LOW 20 MIN: CPT

## 2019-05-30 RX ORDER — NORTRIPTYLINE HYDROCHLORIDE 10 MG/1
10 CAPSULE ORAL
Refills: 0 | Status: DISCONTINUED | COMMUNITY
End: 2019-05-30

## 2019-05-30 RX ORDER — BUTALBITAL, ASPIRIN, AND CAFFEINE 325; 50; 40 MG/1; MG/1; MG/1
50-325-40 CAPSULE ORAL
Refills: 0 | Status: DISCONTINUED | COMMUNITY
End: 2019-05-30

## 2019-05-30 RX ORDER — MULTIVITAMIN
TABLET ORAL
Refills: 0 | Status: ACTIVE | COMMUNITY

## 2019-05-30 RX ORDER — LEVETIRACETAM 500 MG/1
500 TABLET, FILM COATED ORAL
Refills: 0 | Status: DISCONTINUED | COMMUNITY
End: 2019-05-30

## 2019-05-30 RX ORDER — LEVETIRACETAM 500 MG/1
500 TABLET, FILM COATED ORAL
Refills: 0 | Status: ACTIVE | COMMUNITY

## 2019-05-30 RX ORDER — METOPROLOL SUCCINATE 25 MG/1
25 TABLET, EXTENDED RELEASE ORAL
Refills: 0 | Status: ACTIVE | COMMUNITY

## 2019-05-30 NOTE — ASSESSMENT
[FreeTextEntry1] : Resolved left frontal brain contusion. Main concern at this point is intermittent tremor of the hands. Will refer to neurology.

## 2019-05-30 NOTE — DATA REVIEWED
[de-identified] : Of the head without contrast revealed no evidence of subdural collections or acute hemorrhage. [de-identified] : CTA of the head with contrast done on 5/24/19 was unremarkable.

## 2019-05-30 NOTE — REASON FOR VISIT
[Follow-Up: _____] : a [unfilled] follow-up visit [Spouse] : spouse [FreeTextEntry1] : No new head trauma. Has intermittent tremor and the hands according to his English-speaking relative. He does not describe any numbness, tingling or weakness. There is no gait disturbance. Recent CAT scan of the brain shows complete resolution of his previously seen left frontal brain contusions.

## 2019-05-30 NOTE — PHYSICAL EXAM
[Motor Strength] : muscle strength was normal in all four extremities [Abnormal Walk] : normal gait [FreeTextEntry5] : oriented viat  [FreeTextEntry6] : no pronator drift

## 2020-02-19 ENCOUNTER — EMERGENCY (EMERGENCY)
Facility: HOSPITAL | Age: 45
LOS: 1 days | Discharge: HOME | End: 2020-02-19
Attending: EMERGENCY MEDICINE
Payer: MEDICAID

## 2020-02-19 VITALS
OXYGEN SATURATION: 96 % | TEMPERATURE: 98 F | RESPIRATION RATE: 18 BRPM | HEART RATE: 88 BPM | DIASTOLIC BLOOD PRESSURE: 85 MMHG | SYSTOLIC BLOOD PRESSURE: 142 MMHG

## 2020-02-19 VITALS
WEIGHT: 143.96 LBS | SYSTOLIC BLOOD PRESSURE: 148 MMHG | OXYGEN SATURATION: 95 % | DIASTOLIC BLOOD PRESSURE: 91 MMHG | HEIGHT: 64 IN | TEMPERATURE: 98 F | HEART RATE: 79 BPM | RESPIRATION RATE: 18 BRPM

## 2020-02-19 LAB
ACETONE SERPL-MCNC: ABNORMAL
ALBUMIN SERPL ELPH-MCNC: 4.1 G/DL — SIGNIFICANT CHANGE UP (ref 3.5–5)
ALP SERPL-CCNC: 114 U/L — SIGNIFICANT CHANGE UP (ref 40–120)
ALT FLD-CCNC: 22 U/L DA — SIGNIFICANT CHANGE UP (ref 10–60)
ANION GAP SERPL CALC-SCNC: 6 MMOL/L — SIGNIFICANT CHANGE UP (ref 5–17)
APPEARANCE UR: CLEAR — SIGNIFICANT CHANGE UP
AST SERPL-CCNC: 15 U/L — SIGNIFICANT CHANGE UP (ref 10–40)
BACTERIA # UR AUTO: ABNORMAL /HPF
BASOPHILS # BLD AUTO: 0.02 K/UL — SIGNIFICANT CHANGE UP (ref 0–0.2)
BASOPHILS NFR BLD AUTO: 0.4 % — SIGNIFICANT CHANGE UP (ref 0–2)
BILIRUB SERPL-MCNC: 0.6 MG/DL — SIGNIFICANT CHANGE UP (ref 0.2–1.2)
BILIRUB UR-MCNC: NEGATIVE — SIGNIFICANT CHANGE UP
BUN SERPL-MCNC: 13 MG/DL — SIGNIFICANT CHANGE UP (ref 7–18)
CALCIUM SERPL-MCNC: 9 MG/DL — SIGNIFICANT CHANGE UP (ref 8.4–10.5)
CHLORIDE SERPL-SCNC: 101 MMOL/L — SIGNIFICANT CHANGE UP (ref 96–108)
CO2 SERPL-SCNC: 28 MMOL/L — SIGNIFICANT CHANGE UP (ref 22–31)
COLOR SPEC: YELLOW — SIGNIFICANT CHANGE UP
CREAT SERPL-MCNC: 0.82 MG/DL — SIGNIFICANT CHANGE UP (ref 0.5–1.3)
DIFF PNL FLD: NEGATIVE — SIGNIFICANT CHANGE UP
EOSINOPHIL # BLD AUTO: 0.03 K/UL — SIGNIFICANT CHANGE UP (ref 0–0.5)
EOSINOPHIL NFR BLD AUTO: 0.6 % — SIGNIFICANT CHANGE UP (ref 0–6)
EPI CELLS # UR: ABNORMAL /HPF
GLUCOSE SERPL-MCNC: 287 MG/DL — HIGH (ref 70–99)
GLUCOSE UR QL: 1000 MG/DL
HCT VFR BLD CALC: 47.1 % — SIGNIFICANT CHANGE UP (ref 39–50)
HGB BLD-MCNC: 15.7 G/DL — SIGNIFICANT CHANGE UP (ref 13–17)
IMM GRANULOCYTES NFR BLD AUTO: 0.6 % — SIGNIFICANT CHANGE UP (ref 0–1.5)
KETONES UR-MCNC: ABNORMAL
LEUKOCYTE ESTERASE UR-ACNC: NEGATIVE — SIGNIFICANT CHANGE UP
LYMPHOCYTES # BLD AUTO: 1.04 K/UL — SIGNIFICANT CHANGE UP (ref 1–3.3)
LYMPHOCYTES # BLD AUTO: 20.6 % — SIGNIFICANT CHANGE UP (ref 13–44)
MAGNESIUM SERPL-MCNC: 2.1 MG/DL — SIGNIFICANT CHANGE UP (ref 1.6–2.6)
MCHC RBC-ENTMCNC: 26.1 PG — LOW (ref 27–34)
MCHC RBC-ENTMCNC: 33.3 GM/DL — SIGNIFICANT CHANGE UP (ref 32–36)
MCV RBC AUTO: 78.2 FL — LOW (ref 80–100)
MONOCYTES # BLD AUTO: 0.39 K/UL — SIGNIFICANT CHANGE UP (ref 0–0.9)
MONOCYTES NFR BLD AUTO: 7.7 % — SIGNIFICANT CHANGE UP (ref 2–14)
NEUTROPHILS # BLD AUTO: 3.55 K/UL — SIGNIFICANT CHANGE UP (ref 1.8–7.4)
NEUTROPHILS NFR BLD AUTO: 70.1 % — SIGNIFICANT CHANGE UP (ref 43–77)
NITRITE UR-MCNC: NEGATIVE — SIGNIFICANT CHANGE UP
NRBC # BLD: 0 /100 WBCS — SIGNIFICANT CHANGE UP (ref 0–0)
NT-PROBNP SERPL-SCNC: <5 PG/ML — SIGNIFICANT CHANGE UP (ref 0–125)
PH UR: 5 — SIGNIFICANT CHANGE UP (ref 5–8)
PLATELET # BLD AUTO: 132 K/UL — LOW (ref 150–400)
POTASSIUM SERPL-MCNC: 4.1 MMOL/L — SIGNIFICANT CHANGE UP (ref 3.5–5.3)
POTASSIUM SERPL-SCNC: 4.1 MMOL/L — SIGNIFICANT CHANGE UP (ref 3.5–5.3)
PROT SERPL-MCNC: 7.8 G/DL — SIGNIFICANT CHANGE UP (ref 6–8.3)
PROT UR-MCNC: 15
RBC # BLD: 6.02 M/UL — HIGH (ref 4.2–5.8)
RBC # FLD: 15.4 % — HIGH (ref 10.3–14.5)
SODIUM SERPL-SCNC: 135 MMOL/L — SIGNIFICANT CHANGE UP (ref 135–145)
SP GR SPEC: 1.02 — SIGNIFICANT CHANGE UP (ref 1.01–1.02)
TROPONIN I SERPL-MCNC: <0.015 NG/ML — SIGNIFICANT CHANGE UP (ref 0–0.04)
UROBILINOGEN FLD QL: NEGATIVE — SIGNIFICANT CHANGE UP
WBC # BLD: 5.06 K/UL — SIGNIFICANT CHANGE UP (ref 3.8–10.5)
WBC # FLD AUTO: 5.06 K/UL — SIGNIFICANT CHANGE UP (ref 3.8–10.5)
WBC UR QL: SIGNIFICANT CHANGE UP /HPF (ref 0–5)

## 2020-02-19 PROCEDURE — 93005 ELECTROCARDIOGRAM TRACING: CPT

## 2020-02-19 PROCEDURE — 71045 X-RAY EXAM CHEST 1 VIEW: CPT

## 2020-02-19 PROCEDURE — 99285 EMERGENCY DEPT VISIT HI MDM: CPT

## 2020-02-19 PROCEDURE — 83880 ASSAY OF NATRIURETIC PEPTIDE: CPT

## 2020-02-19 PROCEDURE — 96366 THER/PROPH/DIAG IV INF ADDON: CPT

## 2020-02-19 PROCEDURE — 99284 EMERGENCY DEPT VISIT MOD MDM: CPT | Mod: 25

## 2020-02-19 PROCEDURE — 85027 COMPLETE CBC AUTOMATED: CPT

## 2020-02-19 PROCEDURE — 83735 ASSAY OF MAGNESIUM: CPT

## 2020-02-19 PROCEDURE — 36415 COLL VENOUS BLD VENIPUNCTURE: CPT

## 2020-02-19 PROCEDURE — 82009 KETONE BODYS QUAL: CPT

## 2020-02-19 PROCEDURE — 84484 ASSAY OF TROPONIN QUANT: CPT

## 2020-02-19 PROCEDURE — 71045 X-RAY EXAM CHEST 1 VIEW: CPT | Mod: 26

## 2020-02-19 PROCEDURE — 96365 THER/PROPH/DIAG IV INF INIT: CPT

## 2020-02-19 PROCEDURE — 87086 URINE CULTURE/COLONY COUNT: CPT

## 2020-02-19 PROCEDURE — 82962 GLUCOSE BLOOD TEST: CPT

## 2020-02-19 PROCEDURE — 81001 URINALYSIS AUTO W/SCOPE: CPT

## 2020-02-19 PROCEDURE — 80053 COMPREHEN METABOLIC PANEL: CPT

## 2020-02-19 RX ORDER — ACETAMINOPHEN 500 MG
1000 TABLET ORAL ONCE
Refills: 0 | Status: COMPLETED | OUTPATIENT
Start: 2020-02-19 | End: 2020-02-19

## 2020-02-19 RX ORDER — SODIUM CHLORIDE 9 MG/ML
2000 INJECTION INTRAMUSCULAR; INTRAVENOUS; SUBCUTANEOUS ONCE
Refills: 0 | Status: COMPLETED | OUTPATIENT
Start: 2020-02-19 | End: 2020-02-19

## 2020-02-19 RX ADMIN — SODIUM CHLORIDE 2000 MILLILITER(S): 9 INJECTION INTRAMUSCULAR; INTRAVENOUS; SUBCUTANEOUS at 15:42

## 2020-02-19 RX ADMIN — SODIUM CHLORIDE 2000 MILLILITER(S): 9 INJECTION INTRAMUSCULAR; INTRAVENOUS; SUBCUTANEOUS at 19:39

## 2020-02-19 RX ADMIN — Medication 400 MILLIGRAM(S): at 15:43

## 2020-02-19 RX ADMIN — Medication 1000 MILLIGRAM(S): at 19:39

## 2020-02-19 RX ADMIN — Medication 1000 MILLIGRAM(S): at 19:40

## 2020-02-19 NOTE — ED PROVIDER NOTE - PATIENT PORTAL LINK FT
You can access the FollowMyHealth Patient Portal offered by [facility] by registering at the following website: [email or web address]. By joining [employer]’s FollowMyHealth portal, you will also be able to view your health information using other applications (apps) compatible with our system.

## 2020-02-19 NOTE — ED PROVIDER NOTE - CLINICAL SUMMARY MEDICAL DECISION MAKING FREE TEXT BOX
Patient with polyuria polydipsia for three months, finally went to PMD and underwent blood work showing elevated blood glucose. Sent to ED for further evaluation.

## 2020-02-19 NOTE — ED PROVIDER NOTE - ENMT, MLM
Airway patent, Nasal mucosa clear. Mouth with Moderate dry mucosa. Throat has no vesicles, no oropharyngeal exudates and uvula is midline.

## 2020-02-19 NOTE — ED PROVIDER NOTE - OBJECTIVE STATEMENT
44 year old male with no pertinent PMHx or PSHx presents to the ED for elevated blood sugar levels. Patient states that yesterday he visited his primary care physician for evaluation of dizziness, increased thirst and urination, and fatigue for the past three months. Patient reports that his primary care physician performed lab work during his visit yesterday, and found his blood glucose level to be 578. Patient states that he was referred to the ED for further evaluation. Patient notes that he did not seek medical attention regarding his symptoms prior to yesterday, and notes that he has otherwise been eating normally. Patient denies all other acute complaints. Patient states that he has not done any recent travel. NKDA.

## 2020-02-19 NOTE — ED ADULT NURSE NOTE - OBJECTIVE STATEMENT
Pt presented to the ED for  Hyperglycemia and abdominal pain.  Pt is in no acute distress at the time.

## 2020-02-19 NOTE — ED ADULT NURSE NOTE - NS ED NURSE LEVEL OF CONSCIOUSNESS ORIENTATION
Oriented - self; Oriented - place; Oriented - time [Well Nourished] : well nourished [No Acute Distress] : no acute distress [Well Developed] : well developed [Normal Sclera/Conjunctiva] : normal sclera/conjunctiva [Well-Appearing] : well-appearing [PERRL] : pupils equal round and reactive to light [EOMI] : extraocular movements intact [Normal Oropharynx] : the oropharynx was normal [Normal Outer Ear/Nose] : the outer ears and nose were normal in appearance [Supple] : supple [Normal TMs] : both tympanic membranes were normal [No Lymphadenopathy] : no lymphadenopathy [No Respiratory Distress] : no respiratory distress  [Clear to Auscultation] : lungs were clear to auscultation bilaterally [Normal Rate] : normal rate  [No Accessory Muscle Use] : no accessory muscle use [Regular Rhythm] : with a regular rhythm [Normal S1, S2] : normal S1 and S2 [Pedal Pulses Present] : the pedal pulses are present [No Murmur] : no murmur heard [Soft] : abdomen soft [No Edema] : there was no peripheral edema [Non Tender] : non-tender [Non-distended] : non-distended [No Masses] : no abdominal mass palpated [No HSM] : no HSM [Normal Bowel Sounds] : normal bowel sounds [Normal Anterior Cervical Nodes] : no anterior cervical lymphadenopathy [Normal Posterior Cervical Nodes] : no posterior cervical lymphadenopathy [Normal Gait] : normal gait [No Rash] : no rash [Coordination Grossly Intact] : coordination grossly intact [No Focal Deficits] : no focal deficits [Normal Insight/Judgement] : insight and judgment were intact [Normal Affect] : the affect was normal

## 2020-02-19 NOTE — ED PROVIDER NOTE - NS HIV RISK FACTOR YES
Patient Instructions by Chuy Stone MD at 06/07/17 08:13 AM     Author:  Chuy Stone MD Service:  (none) Author Type:  Physician     Filed:  06/07/17 08:13 AM Encounter Date:  6/7/2017 Status:  Signed     :  Chuy Stone MD (Physician)            -there is no evidence for blood clot  -can drive--but if in a car for more than 45 minutes, get out and walk around  -continue to walk 5-10 minutes every hr you are awake for 2 weeks  -return to office in 1 month for re-evaluation  -avoid very heavy, strenuous physical activity for 1 more week  Additional Educational Resources: For additional resources regarding your symptoms, diagnosis, or further health information, please visit the Health Resources section on [email or web address] or the Online Health Resources section in [employer].           Revision History        User Key Date/Time User Provider Type Action    > [N/A] 06/07/17 08:13 AM Chuy Stone MD Physician Sign
Declined

## 2020-02-20 LAB
CULTURE RESULTS: SIGNIFICANT CHANGE UP
SPECIMEN SOURCE: SIGNIFICANT CHANGE UP
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
## 2021-11-15 PROBLEM — Z78.9 OTHER SPECIFIED HEALTH STATUS: Chronic | Status: ACTIVE | Noted: 2020-02-19

## 2021-11-29 ENCOUNTER — APPOINTMENT (OUTPATIENT)
Dept: UROLOGY | Facility: CLINIC | Age: 46
End: 2021-11-29

## 2024-04-11 ENCOUNTER — EMERGENCY (EMERGENCY)
Facility: HOSPITAL | Age: 49
LOS: 1 days | Discharge: HOME | End: 2024-04-11
Attending: EMERGENCY MEDICINE
Payer: MEDICAID

## 2024-04-11 VITALS
RESPIRATION RATE: 18 BRPM | SYSTOLIC BLOOD PRESSURE: 131 MMHG | TEMPERATURE: 98 F | HEIGHT: 65 IN | DIASTOLIC BLOOD PRESSURE: 69 MMHG | OXYGEN SATURATION: 99 % | WEIGHT: 147.05 LBS | HEART RATE: 110 BPM

## 2024-04-11 LAB
ALBUMIN SERPL ELPH-MCNC: 3.6 G/DL — SIGNIFICANT CHANGE UP (ref 3.5–5)
ALP SERPL-CCNC: 66 U/L — SIGNIFICANT CHANGE UP (ref 40–120)
ALT FLD-CCNC: 26 U/L DA — SIGNIFICANT CHANGE UP (ref 10–60)
ANION GAP SERPL CALC-SCNC: 4 MMOL/L — LOW (ref 5–17)
ANISOCYTOSIS BLD QL: SLIGHT — SIGNIFICANT CHANGE UP
AST SERPL-CCNC: 17 U/L — SIGNIFICANT CHANGE UP (ref 10–40)
BASOPHILS # BLD AUTO: 0.02 K/UL — SIGNIFICANT CHANGE UP (ref 0–0.2)
BASOPHILS NFR BLD AUTO: 0.4 % — SIGNIFICANT CHANGE UP (ref 0–2)
BILIRUB SERPL-MCNC: 0.8 MG/DL — SIGNIFICANT CHANGE UP (ref 0.2–1.2)
BLD GP AB SCN SERPL QL: SIGNIFICANT CHANGE UP
BUN SERPL-MCNC: 14 MG/DL — SIGNIFICANT CHANGE UP (ref 7–18)
CALCIUM SERPL-MCNC: 9 MG/DL — SIGNIFICANT CHANGE UP (ref 8.4–10.5)
CHLORIDE SERPL-SCNC: 103 MMOL/L — SIGNIFICANT CHANGE UP (ref 96–108)
CO2 SERPL-SCNC: 26 MMOL/L — SIGNIFICANT CHANGE UP (ref 22–31)
CREAT SERPL-MCNC: 0.93 MG/DL — SIGNIFICANT CHANGE UP (ref 0.5–1.3)
DACRYOCYTES BLD QL SMEAR: SLIGHT — SIGNIFICANT CHANGE UP
EGFR: 99 ML/MIN/1.73M2 — SIGNIFICANT CHANGE UP
ELLIPTOCYTES BLD QL SMEAR: SLIGHT — SIGNIFICANT CHANGE UP
EOSINOPHIL # BLD AUTO: 0.02 K/UL — SIGNIFICANT CHANGE UP (ref 0–0.5)
EOSINOPHIL NFR BLD AUTO: 0.4 % — SIGNIFICANT CHANGE UP (ref 0–6)
GLUCOSE SERPL-MCNC: 139 MG/DL — HIGH (ref 70–99)
HCT VFR BLD CALC: 20.4 % — CRITICAL LOW (ref 39–50)
HGB BLD-MCNC: 5.6 G/DL — CRITICAL LOW (ref 13–17)
IMM GRANULOCYTES NFR BLD AUTO: 0.4 % — SIGNIFICANT CHANGE UP (ref 0–0.9)
IRON SATN MFR SERPL: 11 UG/DL — LOW (ref 65–170)
IRON SATN MFR SERPL: 2 % — LOW (ref 20–55)
LG PLATELETS BLD QL AUTO: SLIGHT — SIGNIFICANT CHANGE UP
LYMPHOCYTES # BLD AUTO: 0.85 K/UL — LOW (ref 1–3.3)
LYMPHOCYTES # BLD AUTO: 16.2 % — SIGNIFICANT CHANGE UP (ref 13–44)
MAGNESIUM SERPL-MCNC: 2 MG/DL — SIGNIFICANT CHANGE UP (ref 1.6–2.6)
MANUAL SMEAR VERIFICATION: SIGNIFICANT CHANGE UP
MCHC RBC-ENTMCNC: 17.9 PG — LOW (ref 27–34)
MCHC RBC-ENTMCNC: 27.5 GM/DL — LOW (ref 32–36)
MCV RBC AUTO: 65.2 FL — LOW (ref 80–100)
MICROCYTES BLD QL: SLIGHT — SIGNIFICANT CHANGE UP
MONOCYTES # BLD AUTO: 0.45 K/UL — SIGNIFICANT CHANGE UP (ref 0–0.9)
MONOCYTES NFR BLD AUTO: 8.6 % — SIGNIFICANT CHANGE UP (ref 2–14)
NEUTROPHILS # BLD AUTO: 3.89 K/UL — SIGNIFICANT CHANGE UP (ref 1.8–7.4)
NEUTROPHILS NFR BLD AUTO: 74 % — SIGNIFICANT CHANGE UP (ref 43–77)
NRBC # BLD: 0 /100 WBCS — SIGNIFICANT CHANGE UP (ref 0–0)
PHOSPHATE SERPL-MCNC: 3 MG/DL — SIGNIFICANT CHANGE UP (ref 2.5–4.5)
PLAT MORPH BLD: NORMAL — SIGNIFICANT CHANGE UP
PLATELET # BLD AUTO: 207 K/UL — SIGNIFICANT CHANGE UP (ref 150–400)
POIKILOCYTOSIS BLD QL AUTO: SLIGHT — SIGNIFICANT CHANGE UP
POTASSIUM SERPL-MCNC: 3.8 MMOL/L — SIGNIFICANT CHANGE UP (ref 3.5–5.3)
POTASSIUM SERPL-SCNC: 3.8 MMOL/L — SIGNIFICANT CHANGE UP (ref 3.5–5.3)
PROT SERPL-MCNC: 7.3 G/DL — SIGNIFICANT CHANGE UP (ref 6–8.3)
RBC # BLD: 3.13 M/UL — LOW (ref 4.2–5.8)
RBC # FLD: 18 % — HIGH (ref 10.3–14.5)
RBC BLD AUTO: ABNORMAL
SODIUM SERPL-SCNC: 133 MMOL/L — LOW (ref 135–145)
TARGETS BLD QL SMEAR: SLIGHT — SIGNIFICANT CHANGE UP
TIBC SERPL-MCNC: 521 UG/DL — HIGH (ref 250–450)
TROPONIN I, HIGH SENSITIVITY RESULT: 4.1 NG/L — SIGNIFICANT CHANGE UP
UIBC SERPL-MCNC: 510 UG/DL — HIGH (ref 110–370)
WBC # BLD: 5.25 K/UL — SIGNIFICANT CHANGE UP (ref 3.8–10.5)
WBC # FLD AUTO: 5.25 K/UL — SIGNIFICANT CHANGE UP (ref 3.8–10.5)

## 2024-04-11 PROCEDURE — 99291 CRITICAL CARE FIRST HOUR: CPT

## 2024-04-11 PROCEDURE — 93010 ELECTROCARDIOGRAM REPORT: CPT

## 2024-04-11 PROCEDURE — 71275 CT ANGIOGRAPHY CHEST: CPT | Mod: 26,MC

## 2024-04-11 NOTE — ED ADULT NURSE NOTE - ED STAT RN HANDOFF DETAILS
Pt resting in bed, breathing unlabored room air. MD evaluation on going. Endorses to [place] for continuation of care.

## 2024-04-11 NOTE — ED PROVIDER NOTE - PROGRESS NOTE DETAILS
Patient found to have critically low anemia which could explain his symptoms.  Further history obtained with patient and he does endorse that he does have anemia in the past.  He had a prior episode of melena in 2018 or 19 when he needed a blood transfusion but he has not noticed any further episodes of bleeding since that time.  Consent obtained for blood transfusion. Received signout from Dr. Sanchez with plan to reassess after 2units of PRBC and dc if improved.  Patient received 2 units of blood - feels better, wants to go home, advised rapid followup with his PMD this week. Strict return precautions provided.

## 2024-04-11 NOTE — ED PROVIDER NOTE - NSICDXPASTMEDICALHX_GEN_ALL_CORE_FT
PAST MEDICAL HISTORY:  Alcohol abuse     No pertinent past medical history     No pertinent past medical history

## 2024-04-11 NOTE — ED PROVIDER NOTE - OBJECTIVE STATEMENT
49-year-old man with a past medical history of hypertension, diabetes, hyperlipidemia presenting complaining of dyspnea on exertion for the past week also having tingling in bilateral hands.  Denies chest pains to me but endorsed them to triage nurse.  Recently flew to and from [place] in the past few weeks.  No prior history of heart attacks or blood clots.  Not on anticoagulation.

## 2024-04-11 NOTE — ED PROVIDER NOTE - CLINICAL SUMMARY MEDICAL DECISION MAKING FREE TEXT BOX
Presenting with acute dyspnea on exertion and chest pain after recent international travel.  Tachycardic on arrival.  Plan for labs and troponin to rule out ACS, CTA to screen for PE and reevaluate.

## 2024-04-11 NOTE — ED ADULT NURSE NOTE - OBJECTIVE STATEMENT
Patient complain of chest pain associated w/ headache, palpitations/ SOB x1 week. Patient denies nausea/vomiting. No signs of acute distress noted.     -Icelandic: Travon [id number]

## 2024-04-11 NOTE — ED PROVIDER NOTE - PHYSICAL EXAMINATION
Exam:  General: Patient well appearing, tachycardic at rest otherwise vital signs within normal limits  HEENT: airway patent with moist mucous membranes  Cardiac: regular tachycardia S1/S2 with strong peripheral pulses  Respiratory: lungs clear without respiratory distress  GI: abdomen soft, non tender, non distended  Neuro: no gross neurologic deficits  Skin: warm, well perfused  Psych: normal mood and affect

## 2024-04-11 NOTE — ED PROVIDER NOTE - CCCP TRG CHIEF CMPLNT
chest pain Consent: The risks of pain and injection site reactions were reviewed with the patient prior to the injection.

## 2024-04-11 NOTE — ED PROVIDER NOTE - PATIENT PORTAL LINK FT
You can access the FollowMyHealth Patient Portal offered by [facility] by registering at the following website: [email or web address]. By joining [employer]’s FollowMyHealth portal, you will also be able to view your health information using other applications (apps) compatible with our system.

## 2024-04-11 NOTE — ED ADULT NURSE NOTE - ISOLATION TYPE:
Encounter Date: 6/25/2018       History     Chief Complaint   Patient presents with    Sore Throat     pt reports cough, chills and sore throat since Tuesday. pt reports taking medicine for symptoms but has not helped    Cough    Chills     20 y/o male which presents with cough, sore throat, nasal congestion and night sweats for 6 days. Pt states he thinks he had fever but did not take it. He does state he had chills on and off. Denies abdominal pain, chest pain, or dizziness. Pt has taking Dayquil with no relief.       The history is provided by the patient.     Review of patient's allergies indicates:  No Known Allergies  History reviewed. No pertinent past medical history.  History reviewed. No pertinent surgical history.  History reviewed. No pertinent family history.  Social History   Substance Use Topics    Smoking status: Current Some Day Smoker    Smokeless tobacco: Never Used    Alcohol use Yes      Comment: occassionally     Review of Systems   Constitutional: Positive for chills and fever.   HENT: Positive for congestion, sinus pressure and sore throat. Negative for ear pain and nosebleeds.    Eyes: Negative.    Respiratory: Positive for cough. Negative for shortness of breath.    Cardiovascular: Positive for chest pain.   Gastrointestinal: Negative.    Genitourinary: Negative.    Musculoskeletal: Negative.    Neurological: Negative.      Physical Exam     Initial Vitals [06/25/18 1411]   BP Pulse Resp Temp SpO2   126/71 92 17 98 °F (36.7 °C) 96 %      MAP       --         Physical Exam    Constitutional: He appears well-developed and well-nourished.   HENT:   Head: Normocephalic and atraumatic.   Right Ear: Hearing, external ear and ear canal normal. Tympanic membrane is injected. Tympanic membrane is not perforated, not erythematous, not retracted and not bulging. A middle ear effusion is present.   Left Ear: Hearing, external ear and ear canal normal. Tympanic membrane is injected. Tympanic  membrane is not perforated, not erythematous, not retracted and not bulging. A middle ear effusion is present.   Nose: Mucosal edema and rhinorrhea present. No nose lacerations, sinus tenderness, nasal deformity, septal deviation or nasal septal hematoma. No epistaxis.  No foreign bodies.   Mouth/Throat: Uvula is midline, oropharynx is clear and moist and mucous membranes are normal.   Cardiovascular: Normal rate, regular rhythm, normal heart sounds and intact distal pulses. Exam reveals no gallop and no friction rub.    No murmur heard.  Pulmonary/Chest: No accessory muscle usage. No apnea, no tachypnea and no bradypnea. No respiratory distress. He has wheezes in the left upper field. He has rhonchi in the left upper field. He has no rales. He exhibits no tenderness.   All other lung fields clear to auscultation except JOHNATHON- bronchospasms noted with coughing.    Abdominal: Soft. Bowel sounds are normal.   Musculoskeletal: Normal range of motion.   Neurological: He is alert and oriented to person, place, and time.   Skin: Skin is warm and dry.       ED Course   Procedures  Labs Reviewed - No data to display       Imaging Results          X-Ray Chest PA And Lateral (Final result)  Result time 06/25/18 14:28:58    Final result by Dima Kiran MD (06/25/18 14:28:58)                 Impression:      No evidence of acute cardiopulmonary disease.      Electronically signed by: Dima Kiran MD  Date:    06/25/2018  Time:    14:28             Narrative:    EXAMINATION:  XR CHEST PA AND LATERAL    CLINICAL HISTORY:  Wheezing    TECHNIQUE:  PA and lateral views of the chest were performed.    COMPARISON:  None    FINDINGS:  The cardiomediastinal silhouette is normal in size and midline. Pulmonary vascularity appears within normal limits.    The lungs appear clear without confluent pulmonary parenchymal opacity. No pleural fluid.    Osseous structures appear intact.                                 Medical Decision  Making:   Initial Assessment:   20 y/o male which presents with 6 day history of coughing, sore throat, nasal congestion, and night sweats.   Differential Diagnosis:   Bronchitis, pneumonia, URI  Clinical Tests:   Radiological Study: Ordered  ED Management:  Pt examined, CXR without pneumonia. Pt educated on inhaler use and when to come back to the ED for further evaluation. The patient voiced understanding.                       Clinical Impression:   The primary encounter diagnosis was Bronchitis. Diagnoses of Wheezing and Viral upper respiratory tract infection were also pertinent to this visit.                             Pattie Mcrae, [facility]  06/25/18 0725     None

## 2024-04-11 NOTE — ED PROVIDER NOTE - NSFOLLOWUPINSTRUCTIONS_ED_ALL_ED_FT
You were seen in the emergency department for: shortness of breath  Your diagnosis for this visit was: anemia  You received a blood transfusion for your anemia.   We recommend you follow up with: your primary care doctor.     Please return to the Emergency Department if you experience any of the following symptoms:   - Shortness of breath or trouble breathing  - Pressure, pain or tightness in the chest  - Face drooping, arm weakness or speech difficulty  - Persistence of severe vomiting  - Head injury or loss of consciousness  - Nonstop bleeding or an open wound    (1) Follow up with your primary care physician within the next 24-48 hours as discussed. In addition, we did not find evidence of a life threatening illness on your testing here today, but listed below are the specialists that will be necessary to see as an outpatient to continue the workup.  Please call the numbers listed below or [phone] to set up the necessary appointments.  (2) Take Tylenol (up to 1000mg or 1 g)  and/or Motrin (up to 600mg) up to every 6 hours as needed for pain.   (3) If you had an IV (intravenous) line placed, it was removed. Sometimes, after IV removal, that area can be tender for a few days; if it develops redness and swelling, those could be signs of infection; in which case, return to the Emergency Department for assessment.  (4) Please continue taking all of your home medications as directed.

## 2024-04-12 VITALS — SYSTOLIC BLOOD PRESSURE: 113 MMHG | HEART RATE: 86 BPM | DIASTOLIC BLOOD PRESSURE: 78 MMHG

## 2024-04-12 LAB
FERRITIN SERPL-MCNC: 4 NG/ML — LOW (ref 30–400)
TRANSFERRIN SERPL-MCNC: 398 MG/DL — HIGH (ref 200–360)

## 2024-04-12 PROCEDURE — 82728 ASSAY OF FERRITIN: CPT

## 2024-04-12 PROCEDURE — 93005 ELECTROCARDIOGRAM TRACING: CPT

## 2024-04-12 PROCEDURE — 86850 RBC ANTIBODY SCREEN: CPT

## 2024-04-12 PROCEDURE — 84100 ASSAY OF PHOSPHORUS: CPT

## 2024-04-12 PROCEDURE — 83540 ASSAY OF IRON: CPT

## 2024-04-12 PROCEDURE — 84466 ASSAY OF TRANSFERRIN: CPT

## 2024-04-12 PROCEDURE — 71275 CT ANGIOGRAPHY CHEST: CPT | Mod: MC

## 2024-04-12 PROCEDURE — T1013: CPT

## 2024-04-12 PROCEDURE — 80053 COMPREHEN METABOLIC PANEL: CPT

## 2024-04-12 PROCEDURE — 83550 IRON BINDING TEST: CPT

## 2024-04-12 PROCEDURE — 85025 COMPLETE CBC W/AUTO DIFF WBC: CPT

## 2024-04-12 PROCEDURE — 36415 COLL VENOUS BLD VENIPUNCTURE: CPT

## 2024-04-12 PROCEDURE — 83735 ASSAY OF MAGNESIUM: CPT

## 2024-04-12 PROCEDURE — 36430 TRANSFUSION BLD/BLD COMPNT: CPT

## 2024-04-12 PROCEDURE — 99291 CRITICAL CARE FIRST HOUR: CPT | Mod: 25

## 2024-04-12 PROCEDURE — 86901 BLOOD TYPING SEROLOGIC RH(D): CPT

## 2024-04-12 PROCEDURE — 86923 COMPATIBILITY TEST ELECTRIC: CPT

## 2024-04-12 PROCEDURE — P9040: CPT

## 2024-04-12 PROCEDURE — 84484 ASSAY OF TROPONIN QUANT: CPT

## 2024-04-12 PROCEDURE — 86900 BLOOD TYPING SEROLOGIC ABO: CPT

## 2024-07-31 NOTE — ED ADULT NURSE NOTE - CHPI ED NUR SYMPTOMS POS
Pt to infusion room for first taxol,, carbo  Pt seen by Dr Villasenor and treatment plan changed to every 3 weeks instead of every week  Reviewed all meds , side effects , indication and treatment protocol with patient and spouse  Questions answered and encouraged.  Chemo education and chemo permit previously done.  Blood glucose 293 - Per Dr Villasenor give pt 8u reg insulin with treatment today  Pt advised to monitor blood sugar at home due to steroids .   Pt V/U - has glucometer at home and monitors frequently.  Pt is on scheduled insulin at night .  Pt states if BS stays up , he will call his PCP  Pt tolerated first treatment well.  Pt and spouse know to call for questions or concerns.  Discharged home stable   CHEST PAIN/PALPITATIONS/SHORTNESS OF BREATH

## 2025-08-05 ENCOUNTER — NON-APPOINTMENT (OUTPATIENT)
Age: 50
End: 2025-08-05